# Patient Record
Sex: MALE | Race: WHITE | NOT HISPANIC OR LATINO
[De-identification: names, ages, dates, MRNs, and addresses within clinical notes are randomized per-mention and may not be internally consistent; named-entity substitution may affect disease eponyms.]

---

## 2017-04-04 ENCOUNTER — APPOINTMENT (OUTPATIENT)
Dept: UROLOGY | Facility: CLINIC | Age: 62
End: 2017-04-04

## 2017-10-17 ENCOUNTER — APPOINTMENT (OUTPATIENT)
Dept: UROLOGY | Facility: CLINIC | Age: 62
End: 2017-10-17
Payer: COMMERCIAL

## 2017-10-17 PROCEDURE — 99213 OFFICE O/P EST LOW 20 MIN: CPT

## 2018-04-10 ENCOUNTER — APPOINTMENT (OUTPATIENT)
Dept: UROLOGY | Facility: CLINIC | Age: 63
End: 2018-04-10
Payer: COMMERCIAL

## 2018-04-10 PROCEDURE — 99213 OFFICE O/P EST LOW 20 MIN: CPT

## 2019-04-09 ENCOUNTER — APPOINTMENT (OUTPATIENT)
Dept: UROLOGY | Facility: CLINIC | Age: 64
End: 2019-04-09
Payer: COMMERCIAL

## 2019-04-09 PROCEDURE — 99213 OFFICE O/P EST LOW 20 MIN: CPT

## 2019-04-09 NOTE — ASSESSMENT
[FreeTextEntry1] : Highest that PSA has been - though was 14 or so a few years ago\par plan for MRI in the interim to ensure nothing suspicious

## 2019-04-09 NOTE — PHYSICAL EXAM
[General Appearance - Well Developed] : well developed [General Appearance - Well Nourished] : well nourished [Abdomen Soft] : soft [Abdomen Tenderness] : non-tender [Abdomen Mass (___ Cm)] : no abdominal mass palpated [Abdomen Hernia] : no hernia was discovered [Penis Abnormality] : normal circumcised penis [Urinary Bladder Findings] : the bladder was normal on palpation [Scrotum] : the scrotum was normal [Rectal Exam - Rectum] : digital rectal exam was normal [No Prostate Nodules] : no prostate nodules [Prostate Size ___ gm] : prostate size [unfilled] gm [Edema] : no peripheral edema [] : no respiratory distress [Exaggerated Use Of Accessory Muscles For Inspiration] : no accessory muscle use [Oriented To Time, Place, And Person] : oriented to person, place, and time [Normal Station and Gait] : the gait and station were normal for the patient's age [No Focal Deficits] : no focal deficits [No Palpable Adenopathy] : no palpable adenopathy

## 2019-05-13 ENCOUNTER — FORM ENCOUNTER (OUTPATIENT)
Age: 64
End: 2019-05-13

## 2019-05-14 ENCOUNTER — APPOINTMENT (OUTPATIENT)
Dept: MRI IMAGING | Facility: HOSPITAL | Age: 64
End: 2019-05-14

## 2019-05-14 ENCOUNTER — OUTPATIENT (OUTPATIENT)
Dept: OUTPATIENT SERVICES | Facility: HOSPITAL | Age: 64
LOS: 1 days | End: 2019-05-14
Payer: COMMERCIAL

## 2019-05-14 PROCEDURE — 72197 MRI PELVIS W/O & W/DYE: CPT | Mod: 26

## 2019-05-14 PROCEDURE — 72197 MRI PELVIS W/O & W/DYE: CPT

## 2019-05-14 PROCEDURE — A9585: CPT

## 2019-07-01 NOTE — HISTORY OF PRESENT ILLNESS
[Urinary Urgency] : urinary urgency [Weak Stream] : weak stream [Urinary Incontinence] : no urinary incontinence [FreeTextEntry1] : Patient has history  of an elevated PSA (>10 since 2012) - MRI had an indeterminant lesion and he had 2 negative biopsies, one before and one after the biopsy. MRI noted his prostate to be 40cc. .He also has mild LUTs as detailed below but is not on any medications. Latest PSA 12.  down from 14. It  had been @11 for 18 months prior.   LUTs have not worsened as detailed below. \par Latest PSA 15.  [Urinary Retention] : no urinary retention [Urinary Frequency] : no urinary frequency [Intermittency] : no intermittency [Nocturia] : no nocturia [Straining] : no straining [Hematuria - Gross] : no gross hematuria [Dysuria] : no dysuria [Post-Void Dribbling] : no post-void dribbling [Bladder Spasm] : no bladder spasm [Abdominal Pain] : no abdominal pain normal

## 2020-01-14 ENCOUNTER — APPOINTMENT (OUTPATIENT)
Dept: UROLOGY | Facility: CLINIC | Age: 65
End: 2020-01-14
Payer: COMMERCIAL

## 2020-01-14 PROCEDURE — 99213 OFFICE O/P EST LOW 20 MIN: CPT

## 2020-01-14 NOTE — ASSESSMENT
[FreeTextEntry1] : stable situation with PSA @12 for past few years with some fluctuations up and negative MRI

## 2020-01-14 NOTE — HISTORY OF PRESENT ILLNESS
[Urinary Urgency] : urinary urgency [Weak Stream] : weak stream [FreeTextEntry1] : Patient has history  of an elevated PSA (>10 since 2012) - MRI had an indeterminant lesion and he had 2 negative biopsies, one before and one after the biopsy. MRI noted his prostate to be 40cc. .He also has mild LUTs as detailed below but is not on any medications. Stable \par Latest PSA 12.  down from 14. It  had been @11 for 18 months prior.   LUTs have not worsened as detailed below. \par Latest PSA 15. - then had him do MRI [facility] 4/19 - 70cc prostate and NO suspicious lesons.\par latest PSA 12.8.  [Urinary Incontinence] : no urinary incontinence [Urinary Retention] : no urinary retention [Urinary Frequency] : no urinary frequency [Nocturia] : no nocturia [Straining] : no straining [Intermittency] : no intermittency [Post-Void Dribbling] : no post-void dribbling [Dysuria] : no dysuria [Hematuria - Gross] : no gross hematuria [Bladder Spasm] : no bladder spasm [Abdominal Pain] : no abdominal pain

## 2020-01-14 NOTE — PHYSICAL EXAM
[General Appearance - Well Developed] : well developed [General Appearance - Well Nourished] : well nourished [Abdomen Soft] : soft [Abdomen Tenderness] : non-tender [Rectal Exam - Rectum] : digital rectal exam was normal [No Prostate Nodules] : no prostate nodules [Prostate Size ___ gm] : prostate size [unfilled] gm [Edema] : no peripheral edema [] : no respiratory distress [Exaggerated Use Of Accessory Muscles For Inspiration] : no accessory muscle use [Normal Station and Gait] : the gait and station were normal for the patient's age [Oriented To Time, Place, And Person] : oriented to person, place, and time [No Focal Deficits] : no focal deficits

## 2020-11-17 ENCOUNTER — APPOINTMENT (OUTPATIENT)
Dept: UROLOGY | Facility: CLINIC | Age: 65
End: 2020-11-17
Payer: COMMERCIAL

## 2020-11-17 VITALS
BODY MASS INDEX: 24.33 KG/M2 | SYSTOLIC BLOOD PRESSURE: 110 MMHG | DIASTOLIC BLOOD PRESSURE: 67 MMHG | RESPIRATION RATE: 16 BRPM | HEART RATE: 56 BPM | WEIGHT: 160 LBS

## 2020-11-17 PROCEDURE — 99214 OFFICE O/P EST MOD 30 MIN: CPT

## 2020-11-17 NOTE — PHYSICAL EXAM
[General Appearance - Well Developed] : well developed [General Appearance - Well Nourished] : well nourished [Abdomen Soft] : soft [Abdomen Tenderness] : non-tender [Abdomen Mass (___ Cm)] : no abdominal mass palpated [Rectal Exam - Rectum] : digital rectal exam was normal [No Prostate Nodules] : no prostate nodules [Prostate Size ___ gm] : prostate size [unfilled] gm [Edema] : no peripheral edema [] : no respiratory distress [Exaggerated Use Of Accessory Muscles For Inspiration] : no accessory muscle use [Oriented To Time, Place, And Person] : oriented to person, place, and time [Normal Station and Gait] : the gait and station were normal for the patient's age [No Focal Deficits] : no focal deficits [Inguinal Lymph Nodes Enlarged Bilaterally] : inguinal

## 2020-11-17 NOTE — HISTORY OF PRESENT ILLNESS
[Urinary Urgency] : urinary urgency [Weak Stream] : weak stream [FreeTextEntry1] : Patient has history  of an elevated PSA (>10 since 2012) - MRI had an indeterminant lesion and he had 2 negative biopsies, one before and one after the biopsy. MRI noted his prostate to be 40cc. .He also has mild LUTs as detailed below but is not on any medications. Stable \par Latest PSA 12.  down from 14. It  had been @11 for 18 months prior.   LUTs have not worsened as detailed below. \par Latest PSA 15. - then had him do MRI [facility] 4/19 - 70cc prostate and NO suspicious lesions.\par latest PSA 12.8. - now 10/20 13.9  [Urinary Incontinence] : no urinary incontinence [Urinary Retention] : no urinary retention [Urinary Frequency] : no urinary frequency [Nocturia] : no nocturia [Straining] : no straining [Intermittency] : no intermittency [Post-Void Dribbling] : no post-void dribbling [Dysuria] : no dysuria [Hematuria - Gross] : no gross hematuria [Bladder Spasm] : no bladder spasm [Abdominal Pain] : no abdominal pain

## 2021-11-16 ENCOUNTER — TRANSCRIPTION ENCOUNTER (OUTPATIENT)
Age: 66
End: 2021-11-16

## 2021-11-23 ENCOUNTER — APPOINTMENT (OUTPATIENT)
Dept: UROLOGY | Facility: CLINIC | Age: 66
End: 2021-11-23
Payer: COMMERCIAL

## 2021-11-23 DIAGNOSIS — R82.90 UNSPECIFIED ABNORMAL FINDINGS IN URINE: ICD-10-CM

## 2021-11-23 LAB
APPEARANCE: CLEAR
BACTERIA: NEGATIVE
BILIRUBIN URINE: NEGATIVE
BLOOD URINE: NEGATIVE
COLOR: YELLOW
GLUCOSE QUALITATIVE U: NEGATIVE
HYALINE CASTS: 0 /LPF
KETONES URINE: NEGATIVE
LEUKOCYTE ESTERASE URINE: ABNORMAL
MICROSCOPIC-UA: NORMAL
NITRITE URINE: NEGATIVE
PH URINE: 7.5
PROTEIN URINE: NEGATIVE
RED BLOOD CELLS URINE: 0 /HPF
SPECIFIC GRAVITY URINE: 1.01
SQUAMOUS EPITHELIAL CELLS: 0 /HPF
UROBILINOGEN URINE: NORMAL
WHITE BLOOD CELLS URINE: 4 /HPF

## 2021-11-23 PROCEDURE — 99213 OFFICE O/P EST LOW 20 MIN: CPT

## 2021-11-23 NOTE — HISTORY OF PRESENT ILLNESS
[Urinary Urgency] : urinary urgency [Weak Stream] : weak stream [FreeTextEntry1] : Patient has history  of an elevated PSA (>10 since 2012) - MRI had an indeterminant lesion and he had 2 negative biopsies, one before and one after the biopsy. MRI noted his prostate to be 40cc. .He also has mild LUTs as detailed below but is not on any medications. Stable \par notes a bit more urgency though if thinks about it/ \par \par Latest PSA 12.  down from 14. It  had been @11 for 18 months prior.   LUTs have not worsened as detailed below. \par Latest PSA 15. - then had him do MRI [facility] 4/19 - 70cc prostate and NO suspicious lesions.\par latest PSA 12.8. - now 10/20 13.9 \par 14.7 11/21 - \par had UA with WBCs but no UTI symptoms.  [Urinary Incontinence] : no urinary incontinence [Urinary Retention] : no urinary retention [Urinary Frequency] : no urinary frequency [Nocturia] : no nocturia [Straining] : no straining [Intermittency] : no intermittency [Post-Void Dribbling] : no post-void dribbling [Dysuria] : no dysuria [Hematuria - Gross] : no gross hematuria [Bladder Spasm] : no bladder spasm [Abdominal Pain] : no abdominal pain

## 2021-11-23 NOTE — ASSESSMENT
[FreeTextEntry1] : PSA remains in same region so continued surveillance\par will culture urine as repeat

## 2021-11-24 LAB — BACTERIA UR CULT: NORMAL

## 2022-12-07 ENCOUNTER — APPOINTMENT (OUTPATIENT)
Dept: UROLOGY | Facility: CLINIC | Age: 67
End: 2022-12-07

## 2022-12-07 PROCEDURE — 51798 US URINE CAPACITY MEASURE: CPT

## 2022-12-07 PROCEDURE — 99212 OFFICE O/P EST SF 10 MIN: CPT | Mod: 25

## 2022-12-07 NOTE — PHYSICAL EXAM
[General Appearance - Well Developed] : well developed [General Appearance - Well Nourished] : well nourished [No Prostate Nodules] : no prostate nodules [Prostate Size ___ gm] : prostate size [unfilled] gm [Edema] : no peripheral edema [] : no respiratory distress [Exaggerated Use Of Accessory Muscles For Inspiration] : no accessory muscle use [Oriented To Time, Place, And Person] : oriented to person, place, and time

## 2022-12-07 NOTE — HISTORY OF PRESENT ILLNESS
[FreeTextEntry1] : Patient has history  of an elevated PSA (>10 since 2012) - MRI had an indeterminant lesion and he had 2 negative biopsies, one before and one after the biopsy. MRI noted his prostate to be 40cc. .He also has mild LUTs as detailed below but is not on any medications. Stable \par notes a bit more urgency though if thinks about it/ \par \par Latest PSA 12.  down from 14. It  had been @11 for 18 months prior.   LUTs have not worsened as detailed below. \par Latest PSA 15. - then had him do MRI [facility] 4/19 - 70cc prostate and NO suspicious lesions.\par latest PSA 12.8. - now 10/20 13.9 \par 14.7 11/21 - \par had UA with WBCs but no UTI symptoms. \par \par 12/22 notes some degree of increased urgency - else all stable. If thinks about it worse but if distracted resolves.\par PSA now 16.3 \par PVR 66cc \par

## 2023-06-06 ENCOUNTER — NON-APPOINTMENT (OUTPATIENT)
Age: 68
End: 2023-06-06

## 2023-07-19 ENCOUNTER — APPOINTMENT (OUTPATIENT)
Dept: UROLOGY | Facility: CLINIC | Age: 68
End: 2023-07-19

## 2023-07-24 ENCOUNTER — APPOINTMENT (OUTPATIENT)
Dept: UROLOGY | Facility: CLINIC | Age: 68
End: 2023-07-24
Payer: COMMERCIAL

## 2023-07-24 PROCEDURE — 99212 OFFICE O/P EST SF 10 MIN: CPT | Mod: 95

## 2023-07-24 NOTE — ASSESSMENT
[FreeTextEntry1] : PSA down a bot - overall over pst few years fluctuates but stable\par f/o 0ne year

## 2023-07-24 NOTE — HISTORY OF PRESENT ILLNESS
[FreeTextEntry1] : Patient has history  of an elevated PSA (>10 since 2012) - MRI had an indeterminant lesion and he had 2 negative biopsies, one before and one after the biopsy. MRI noted his prostate to be 40cc. .He also has mild LUTs as detailed below but is not on any medications. Stable \par notes a bit more urgency though if thinks about it/ \par \par Latest PSA 12.  down from 14. It  had been @11 for 18 months prior.   LUTs have not worsened as detailed below. \par Latest PSA 15. - then had him do MRI [facility] 4/19 - 70cc prostate and NO suspicious lesions.\par latest PSA 12.8. - now 10/20 13.9 \par 14.7 11/21 - \par had UA with WBCs but no UTI symptoms. \par \par 12/22 notes some degree of increased urgency - else all stable. If thinks about it worse but if distracted resolves.\par PSA now 16.3 \par PVR 66cc \par \par 7/23 LUTs as before - nothing new.\par PSA 13.6\par \par

## 2024-01-23 ENCOUNTER — APPOINTMENT (OUTPATIENT)
Dept: UROLOGY | Facility: CLINIC | Age: 69
End: 2024-01-23
Payer: COMMERCIAL

## 2024-01-23 VITALS
HEART RATE: 76 BPM | SYSTOLIC BLOOD PRESSURE: 137 MMHG | RESPIRATION RATE: 16 BRPM | TEMPERATURE: 97.2 F | DIASTOLIC BLOOD PRESSURE: 80 MMHG

## 2024-01-23 DIAGNOSIS — R97.20 ELEVATED PROSTATE, SPECIFIC ANTIGEN [PSA]: ICD-10-CM

## 2024-01-23 DIAGNOSIS — N40.1 BENIGN PROSTATIC HYPERPLASIA WITH LOWER URINARY TRACT SYMPMS: ICD-10-CM

## 2024-01-23 PROCEDURE — G2211 COMPLEX E/M VISIT ADD ON: CPT

## 2024-01-23 PROCEDURE — 99213 OFFICE O/P EST LOW 20 MIN: CPT | Mod: 25

## 2024-01-23 PROCEDURE — 51798 US URINE CAPACITY MEASURE: CPT

## 2024-01-23 NOTE — HISTORY OF PRESENT ILLNESS
[FreeTextEntry1] : Patient has history  of an elevated PSA (>10 since 2012) - MRI had an indeterminant lesion and he had 2 negative biopsies, one before and one after the biopsy. MRI noted his prostate to be 40cc. .He also has mild LUTs as detailed below but is not on any medications. Stable  notes a bit more urgency though if thinks about it/   Latest PSA 12.  down from 14. It  had been @11 for 18 months prior.   LUTs have not worsened as detailed below.  Latest PSA 15. - then had him do MRI [facility] 4/19 - 70cc prostate and NO suspicious lesions. latest PSA 12.8. - now 10/20 13.9  14.7 11/21 -  had UA with WBCs but no UTI symptoms.   12/22 notes some degree of increased urgency - else all stable. If thinks about it worse but if distracted resolves. PSA now 16.3  PVR 66cc   7/23 LUTs as before - nothing new. PSA 13.6  1/23/24 - has noted some more urgency, especially if driving but no incontinence or urgent stops. No nocturia and variable frequncy daytime. FOS ok PVR 91 PSA back up to 16.

## 2024-01-23 NOTE — ASSESSMENT
[FreeTextEntry1] : LUts stable and not too bothersome so plan on no therapy. PSA up to 16 again - plan for f/u mpMRI

## 2025-01-28 ENCOUNTER — NON-APPOINTMENT (OUTPATIENT)
Age: 70
End: 2025-01-28

## 2025-01-28 ENCOUNTER — APPOINTMENT (OUTPATIENT)
Dept: UROLOGY | Facility: CLINIC | Age: 70
End: 2025-01-28
Payer: COMMERCIAL

## 2025-01-28 VITALS
HEART RATE: 56 BPM | TEMPERATURE: 97.2 F | RESPIRATION RATE: 16 BRPM | DIASTOLIC BLOOD PRESSURE: 67 MMHG | SYSTOLIC BLOOD PRESSURE: 119 MMHG | OXYGEN SATURATION: 96 %

## 2025-01-28 DIAGNOSIS — R97.20 ELEVATED PROSTATE, SPECIFIC ANTIGEN [PSA]: ICD-10-CM

## 2025-01-28 DIAGNOSIS — N40.1 BENIGN PROSTATIC HYPERPLASIA WITH LOWER URINARY TRACT SYMPMS: ICD-10-CM

## 2025-01-28 PROCEDURE — 51741 ELECTRO-UROFLOWMETRY FIRST: CPT

## 2025-01-28 PROCEDURE — 51798 US URINE CAPACITY MEASURE: CPT

## 2025-01-28 PROCEDURE — 99213 OFFICE O/P EST LOW 20 MIN: CPT | Mod: 25
